# Patient Record
(demographics unavailable — no encounter records)

---

## 2024-11-12 NOTE — HISTORY OF PRESENT ILLNESS
[Home] : at home, [unfilled] , at the time of the visit. [Medical Office: ([facility])___] : at the medical office located in  [Verbal consent obtained from patient] : the patient, [unfilled] [Congestion] : congestion [Cough] : cough [Sore Throat] : sore throat [Earache (L)] : pain in left ear [Earache (R)] : pain in right ear [___ Weeks ago] :  [unfilled] weeks ago [Wheezing] : no wheezing [Shortness Of Breath] : no shortness of breath [Fever] : no fever [de-identified] : Forehead very very stuffy ,no headache no temperature